# Patient Record
Sex: MALE | Race: AMERICAN INDIAN OR ALASKA NATIVE
[De-identification: names, ages, dates, MRNs, and addresses within clinical notes are randomized per-mention and may not be internally consistent; named-entity substitution may affect disease eponyms.]

---

## 2019-03-15 ENCOUNTER — HOSPITAL ENCOUNTER (EMERGENCY)
Dept: HOSPITAL 14 - H.ER | Age: 39
LOS: 1 days | Discharge: HOME | End: 2019-03-16
Payer: SELF-PAY

## 2019-03-15 VITALS
DIASTOLIC BLOOD PRESSURE: 84 MMHG | HEART RATE: 70 BPM | RESPIRATION RATE: 16 BRPM | SYSTOLIC BLOOD PRESSURE: 146 MMHG | OXYGEN SATURATION: 100 % | TEMPERATURE: 97.7 F

## 2019-03-15 DIAGNOSIS — F10.129: Primary | ICD-10-CM

## 2019-03-15 NOTE — ED PDOC
HPI: Psych/Substance Abuse


Time Seen by Provider: 03/15/19 22:45


Chief Complaint (Nursing): Alcohol Ingestion


Chief Complaint (Provider): Alcohol Ingestion


ED Caveat: Intoxicated


History Per: Patient


History/Exam Limitations: intoxication


Modifying Factor(s): Alcohol


Additional Complaint(s): 





40 y/o male was brought to the ED for alcohol intoxication. Patient was brought 

by ambulance to the ED for public intoxication. [place] 

drunk and sleeping and wasn't arousable. Patient admits to drinking. Denies any 

medical complaints. 





Past Medical History


Reviewed: Historical Data, Nursing Documentation, Vital Signs


Vital Signs: 





                                Last Vital Signs











Temp  97.7 F   03/15/19 22:30


 


Pulse  70   03/15/19 22:30


 


Resp  16   03/15/19 22:30


 


BP  146/84   03/15/19 22:30


 


Pulse Ox  100   03/15/19 22:30














- Medical History


PMH: No Chronic Diseases





- Surgical History


Surgical History: No Surg Hx





- Family History


Family History: States: No Known Family Hx





- Allergies


Allergies/Adverse Reactions: 


                                    Allergies











Allergy/AdvReac Type Severity Reaction Status Date / Time


 


shellfish derived Allergy  RASH Verified 03/15/19 22:31














Review of Systems


Review Of Systems: ROS cannot be obtained secondary to pt's inabilty to answer 

questions. (intoxicated)





Physical Exam





- Reviewed


Nursing Documentation Reviewed: Yes


Vital Signs Reviewed: Yes





- Physical Exam


Appears: Positive for: Well, Non-toxic, No Acute Distress


Head Exam: Positive for: ATRAUMATIC, NORMAL INSPECTION, NORMOCEPHALIC


Skin: Positive for: Normal Color, Warm, DRY


Eye Exam: Positive for: Normal appearance, EOMI, PERRL


ENT: Positive for: Normal ENT Inspection


Neck: Positive for: Normal, Painless ROM


Cardiovascular/Chest: Positive for: Regular Rate, Rhythm


Respiratory: Negative for: Respiratory Distress


Extremity: Positive for: Normal ROM.  Negative for: Deformity


Neurological/Psych: Positive for: Awake, Alert, Normal Tone, Mood/Affect (intox

icated), Gait (unsteady), Other (slurred speech)





- ECG


O2 Sat by Pulse Oximetry: 100 (RA)


Pulse Ox Interpretation: Normal





Medical Decision Making


Medical Decision Making: 





Time:23:01


Initial Impression:alcohol intoxication. Will monitor for clinical sobriety


Initial Plan:


* Alcohol serum








00:00


Patient care endorsed to Dr. Ornelas pending clinical sobriety.





 

--------------------------------------------------------------------------------


-----------------


Scribe Attestation:


Documented by Angelito Ramey, acting as a scribe forDr. Vero Sanchez MD.





Provider Scribe Attestation:


All medical record entries made by the Scribe were at my direction and per

sonally dictated by me. I have reviewed the chart and agree that the record 

accurately reflects my personal performance of the history, physical exam, 

medical decision making, and the department course for this patient. I have also

personally directed, reviewed, and agree with the discharge instructions and 

disposition.





Disposition





- Clinical Impression


Clinical Impression: 


 Alcohol use








- Patient ED Disposition


Is Patient to be Admitted: Transfer of Care





- Disposition


Disposition: Transfer of Care


Disposition Time: 00:00


Condition: STABLE


Additional Instructions: 





BRANDEE YIP, thank you for letting us take care of you today. Your provider 

was Nick Ornelas MD and you were treated for ETOH. The emergency medical 

care you received today was directed at your acute symptoms. If you were 

prescribed any medication, please fill it and take as directed. It may take 

several days for your symptoms to resolve. Return to the Emergency Department if

your symptoms worsen, do not improve, or if you have any other problems.





Please contact your doctor or call one of the physicians/clinics you have been 

referred to that are listed on the Patient Visit Information form that is 

included in your discharge packet. Bring any paperwork you were given at 

discharge with you along with any medications you are taking to your follow up 

visit. Our treatment cannot replace ongoing medical care by a primary care 

provider outside of the emergency department.





Thank you for allowing the [facility] team to be part of your care today.








If you had an X-Ray or CT scan: A Radiologist will review the ED reading if any 

change in treatment is needed we will contact you.***





If you had a blood, urine, or wound culture: It will take several days for the 

results, if any change in treatment is needed we will contact you.***





If you had an STI test: It will take 48 hours for the results. Please call after

1 week if you have not heard back.***


Instructions:  Alcohol Use - When Is Drinking a Problem?


Forms:  [employer] (English)


Patient Signed Over To: Nick Ornelas

## 2019-03-16 NOTE — ED PDOC
- ECG


O2 Sat by Pulse Oximetry: 100 (RA)


Pulse Ox Interpretation: Normal





Medical Decision Making


Medical Decision Making: 





Time:00:00


Patient care endorsed from Dr. Sanchez to provider pending clinical sobriety.








00:40


Patient clinically sober for discharge. Diagnosis is alcohol abuse. 








 

--------------------------------------------------------------------------------


-----------------


Scribe Attestation:


Documented by Angelito Ramey, acting as a scribe forDr. Nick Ornelas MD








Provider Scribe Attestation:


All medical record entries made by the Scribe were at my direction and 

personally dictated by me. I have reviewed the chart and agree that the record 

accurately reflects my personal performance of the history, physical exam, 

medical decision making, and the department course for this patient. I have also

personally directed, reviewed, and agree with the discharge instructions and 

disposition.





Disposition





- Clinical Impression


Clinical Impression: 


 Alcohol use








- POA


Present On Arrival: None





- Disposition


Disposition: Routine/Home


Disposition Time: 00:40


Condition: IMPROVED


Additional Instructions: 





BRANDEE YIP, thank you for letting us take care of you today. Your provider 

was Nick Ornelas MD and you were treated for ETOH. The emergency medical 

care you received today was directed at your acute symptoms. If you were 

prescribed any medication, please fill it and take as directed. It may take 

several days for your symptoms to resolve. Return to the Emergency Department if

your symptoms worsen, do not improve, or if you have any other problems.





Please contact your doctor or call one of the physicians/clinics you have been 

referred to that are listed on the Patient Visit Information form that is 

included in your discharge packet. Bring any paperwork you were given at [facility] with you along with any medications you are taking to your follow up visit. 

Our treatment cannot replace ongoing medical care by a primary care provider 

outside of the emergency department.





Thank you for allowing the [facility] team to be part of your care today.








If you had an X-Ray or CT scan: A Radiologist will review the ED reading if any 

change in treatment is needed we will contact you.***





If you had a blood, urine, or wound culture: It will take several days for the 

results, if any change in treatment is needed we will contact you.***





If you had an STI test: It will take 48 hours for the results. Please call after

1 week if you have not heard back.***


Instructions:  Alcohol Use - When Is Drinking a Problem?


Forms:  [facility] (English)